# Patient Record
Sex: MALE | Race: WHITE | Employment: OTHER | URBAN - METROPOLITAN AREA
[De-identification: names, ages, dates, MRNs, and addresses within clinical notes are randomized per-mention and may not be internally consistent; named-entity substitution may affect disease eponyms.]

---

## 2018-02-14 LAB — PROSTATE SPECIFIC ANTIGEN: NORMAL

## 2018-06-11 ENCOUNTER — HOSPITAL ENCOUNTER (INPATIENT)
Age: 83
LOS: 3 days | Discharge: NURSING, REHAB OR LONG-TERM CARE | DRG: 300 | End: 2018-06-14
Attending: EMERGENCY MEDICINE | Admitting: INTERNAL MEDICINE
Payer: MEDICARE

## 2018-06-11 ENCOUNTER — APPOINTMENT (OUTPATIENT)
Dept: ULTRASOUND IMAGING | Age: 83
DRG: 300 | End: 2018-06-11
Payer: MEDICARE

## 2018-06-11 DIAGNOSIS — R60.0 BILATERAL LOWER EXTREMITY EDEMA: Primary | ICD-10-CM

## 2018-06-11 DIAGNOSIS — R26.2 UNABLE TO AMBULATE: ICD-10-CM

## 2018-06-11 DIAGNOSIS — I82.4Z1 LOWER LEG DVT (DEEP VENOUS THROMBOEMBOLISM), ACUTE, RIGHT (HCC): ICD-10-CM

## 2018-06-11 DIAGNOSIS — I82.4Z1 ACUTE DEEP VEIN THROMBOSIS (DVT) OF DISTAL END OF RIGHT LOWER EXTREMITY (HCC): ICD-10-CM

## 2018-06-11 PROBLEM — E11.9 DIABETES MELLITUS TYPE 2, CONTROLLED (HCC): Chronic | Status: ACTIVE | Noted: 2018-06-11

## 2018-06-11 PROBLEM — I82.401 ACUTE DEEP VEIN THROMBOSIS (DVT) OF RIGHT LOWER EXTREMITY (HCC): Status: ACTIVE | Noted: 2018-06-11

## 2018-06-11 PROBLEM — I89.0 LYMPHEDEMA OF LEG: Chronic | Status: ACTIVE | Noted: 2018-06-11

## 2018-06-11 PROBLEM — L03.119 CELLULITIS, LEG: Status: ACTIVE | Noted: 2018-06-11

## 2018-06-11 PROBLEM — E78.2 MIXED HYPERLIPIDEMIA: Chronic | Status: ACTIVE | Noted: 2018-06-11

## 2018-06-11 LAB
ALBUMIN SERPL-MCNC: 3.9 G/DL (ref 3.5–5.2)
ALP BLD-CCNC: 62 U/L (ref 40–129)
ALT SERPL-CCNC: <5 U/L (ref 0–40)
ANION GAP SERPL CALCULATED.3IONS-SCNC: 13 MMOL/L (ref 7–16)
ANISOCYTOSIS: ABNORMAL
APTT: 27 SEC (ref 24.5–35.1)
AST SERPL-CCNC: 10 U/L (ref 0–39)
BASOPHILS ABSOLUTE: 0.05 E9/L (ref 0–0.2)
BASOPHILS RELATIVE PERCENT: 0.4 % (ref 0–2)
BILIRUB SERPL-MCNC: 0.2 MG/DL (ref 0–1.2)
BUN BLDV-MCNC: 22 MG/DL (ref 8–23)
C-REACTIVE PROTEIN: 0.9 MG/DL (ref 0–0.4)
CALCIUM SERPL-MCNC: 9 MG/DL (ref 8.6–10.2)
CHLORIDE BLD-SCNC: 99 MMOL/L (ref 98–107)
CO2: 25 MMOL/L (ref 22–29)
CREAT SERPL-MCNC: 1.1 MG/DL (ref 0.7–1.2)
EOSINOPHILS ABSOLUTE: 0.25 E9/L (ref 0.05–0.5)
EOSINOPHILS RELATIVE PERCENT: 2.1 % (ref 0–6)
GFR AFRICAN AMERICAN: >60
GFR NON-AFRICAN AMERICAN: >60 ML/MIN/1.73
GLUCOSE BLD-MCNC: 204 MG/DL (ref 74–109)
HCT VFR BLD CALC: 36.4 % (ref 37–54)
HEMOGLOBIN: 10.4 G/DL (ref 12.5–16.5)
IMMATURE GRANULOCYTES #: 0.05 E9/L
IMMATURE GRANULOCYTES %: 0.4 % (ref 0–5)
INR BLD: 1
LACTIC ACID: 1.8 MMOL/L (ref 0.5–2.2)
LYMPHOCYTES ABSOLUTE: 1.62 E9/L (ref 1.5–4)
LYMPHOCYTES RELATIVE PERCENT: 13.5 % (ref 20–42)
MCH RBC QN AUTO: 24.2 PG (ref 26–35)
MCHC RBC AUTO-ENTMCNC: 28.6 % (ref 32–34.5)
MCV RBC AUTO: 84.8 FL (ref 80–99.9)
METER GLUCOSE: 135 MG/DL (ref 70–110)
METER GLUCOSE: 145 MG/DL (ref 70–110)
MONOCYTES ABSOLUTE: 0.96 E9/L (ref 0.1–0.95)
MONOCYTES RELATIVE PERCENT: 8 % (ref 2–12)
NEUTROPHILS ABSOLUTE: 9.11 E9/L (ref 1.8–7.3)
NEUTROPHILS RELATIVE PERCENT: 75.6 % (ref 43–80)
OVALOCYTES: ABNORMAL
PDW BLD-RTO: 16 FL (ref 11.5–15)
PLATELET # BLD: 320 E9/L (ref 130–450)
PMV BLD AUTO: 8.5 FL (ref 7–12)
POIKILOCYTES: ABNORMAL
POLYCHROMASIA: ABNORMAL
POTASSIUM SERPL-SCNC: 4.8 MMOL/L (ref 3.5–5)
PRO-BNP: 179 PG/ML (ref 0–450)
PROTHROMBIN TIME: 11.6 SEC (ref 9.3–12.4)
RBC # BLD: 4.29 E12/L (ref 3.8–5.8)
SEDIMENTATION RATE, ERYTHROCYTE: 29 MM/HR (ref 0–15)
SODIUM BLD-SCNC: 137 MMOL/L (ref 132–146)
TOTAL PROTEIN: 7.1 G/DL (ref 6.4–8.3)
WBC # BLD: 12 E9/L (ref 4.5–11.5)

## 2018-06-11 PROCEDURE — 85610 PROTHROMBIN TIME: CPT

## 2018-06-11 PROCEDURE — 80053 COMPREHEN METABOLIC PANEL: CPT

## 2018-06-11 PROCEDURE — 83605 ASSAY OF LACTIC ACID: CPT

## 2018-06-11 PROCEDURE — 85651 RBC SED RATE NONAUTOMATED: CPT

## 2018-06-11 PROCEDURE — 6370000000 HC RX 637 (ALT 250 FOR IP): Performed by: INTERNAL MEDICINE

## 2018-06-11 PROCEDURE — 87186 SC STD MICRODIL/AGAR DIL: CPT

## 2018-06-11 PROCEDURE — 83880 ASSAY OF NATRIURETIC PEPTIDE: CPT

## 2018-06-11 PROCEDURE — G0378 HOSPITAL OBSERVATION PER HR: HCPCS

## 2018-06-11 PROCEDURE — 82962 GLUCOSE BLOOD TEST: CPT

## 2018-06-11 PROCEDURE — 86140 C-REACTIVE PROTEIN: CPT

## 2018-06-11 PROCEDURE — 1200000000 HC SEMI PRIVATE

## 2018-06-11 PROCEDURE — 87040 BLOOD CULTURE FOR BACTERIA: CPT

## 2018-06-11 PROCEDURE — 96372 THER/PROPH/DIAG INJ SC/IM: CPT

## 2018-06-11 PROCEDURE — 85025 COMPLETE CBC W/AUTO DIFF WBC: CPT

## 2018-06-11 PROCEDURE — 99285 EMERGENCY DEPT VISIT HI MDM: CPT

## 2018-06-11 PROCEDURE — 93970 EXTREMITY STUDY: CPT

## 2018-06-11 PROCEDURE — 85730 THROMBOPLASTIN TIME PARTIAL: CPT

## 2018-06-11 PROCEDURE — 87070 CULTURE OTHR SPECIMN AEROBIC: CPT

## 2018-06-11 PROCEDURE — 96374 THER/PROPH/DIAG INJ IV PUSH: CPT

## 2018-06-11 PROCEDURE — 2580000003 HC RX 258: Performed by: INTERNAL MEDICINE

## 2018-06-11 PROCEDURE — 6360000002 HC RX W HCPCS: Performed by: EMERGENCY MEDICINE

## 2018-06-11 RX ORDER — GABAPENTIN 300 MG/1
300 CAPSULE ORAL 3 TIMES DAILY
COMMUNITY
End: 2021-01-01

## 2018-06-11 RX ORDER — PANTOPRAZOLE SODIUM 40 MG/1
40 GRANULE, DELAYED RELEASE ORAL
Status: DISCONTINUED | OUTPATIENT
Start: 2018-06-11 | End: 2018-06-14 | Stop reason: HOSPADM

## 2018-06-11 RX ORDER — DEXTROSE MONOHYDRATE 25 G/50ML
12.5 INJECTION, SOLUTION INTRAVENOUS PRN
Status: DISCONTINUED | OUTPATIENT
Start: 2018-06-11 | End: 2018-06-14 | Stop reason: HOSPADM

## 2018-06-11 RX ORDER — SODIUM CHLORIDE 0.9 % (FLUSH) 0.9 %
10 SYRINGE (ML) INJECTION EVERY 12 HOURS SCHEDULED
Status: DISCONTINUED | OUTPATIENT
Start: 2018-06-11 | End: 2018-06-14 | Stop reason: HOSPADM

## 2018-06-11 RX ORDER — ASPIRIN 81 MG/1
81 TABLET, CHEWABLE ORAL DAILY
Status: DISCONTINUED | OUTPATIENT
Start: 2018-06-11 | End: 2018-06-14 | Stop reason: HOSPADM

## 2018-06-11 RX ORDER — ANTIOX #8/OM3/DHA/EPA/LUT/ZEAX 250-2.5 MG
1 CAPSULE ORAL 2 TIMES DAILY
COMMUNITY

## 2018-06-11 RX ORDER — ACETAMINOPHEN 325 MG/1
650 TABLET ORAL EVERY 4 HOURS PRN
Status: DISCONTINUED | OUTPATIENT
Start: 2018-06-11 | End: 2018-06-14 | Stop reason: HOSPADM

## 2018-06-11 RX ORDER — FUROSEMIDE 40 MG/1
40 TABLET ORAL DAILY
Status: DISCONTINUED | OUTPATIENT
Start: 2018-06-11 | End: 2018-06-14 | Stop reason: HOSPADM

## 2018-06-11 RX ORDER — CHLORAL HYDRATE 500 MG
1000 CAPSULE ORAL 3 TIMES DAILY
COMMUNITY

## 2018-06-11 RX ORDER — CLOTRIMAZOLE 1 %
CREAM (GRAM) TOPICAL 2 TIMES DAILY
Status: DISCONTINUED | OUTPATIENT
Start: 2018-06-11 | End: 2018-06-14 | Stop reason: HOSPADM

## 2018-06-11 RX ORDER — PANTOPRAZOLE SODIUM 40 MG/1
40 GRANULE, DELAYED RELEASE ORAL
COMMUNITY

## 2018-06-11 RX ORDER — TAMSULOSIN HYDROCHLORIDE 0.4 MG/1
0.4 CAPSULE ORAL 2 TIMES DAILY
Status: DISCONTINUED | OUTPATIENT
Start: 2018-06-11 | End: 2018-06-14 | Stop reason: HOSPADM

## 2018-06-11 RX ORDER — MORPHINE SULFATE 2 MG/ML
2 INJECTION, SOLUTION INTRAMUSCULAR; INTRAVENOUS ONCE
Status: COMPLETED | OUTPATIENT
Start: 2018-06-11 | End: 2018-06-11

## 2018-06-11 RX ORDER — ASCORBIC ACID 500 MG
1000 TABLET ORAL DAILY
COMMUNITY

## 2018-06-11 RX ORDER — HYDROCODONE BITARTRATE AND ACETAMINOPHEN 5; 325 MG/1; MG/1
1 TABLET ORAL 3 TIMES DAILY PRN
Status: ON HOLD | COMMUNITY
End: 2018-06-14

## 2018-06-11 RX ORDER — HYDROCODONE BITARTRATE AND ACETAMINOPHEN 5; 325 MG/1; MG/1
1 TABLET ORAL 3 TIMES DAILY PRN
Status: DISCONTINUED | OUTPATIENT
Start: 2018-06-11 | End: 2018-06-14 | Stop reason: HOSPADM

## 2018-06-11 RX ORDER — GLIPIZIDE 5 MG/1
5 TABLET ORAL
Status: DISCONTINUED | OUTPATIENT
Start: 2018-06-11 | End: 2018-06-14 | Stop reason: HOSPADM

## 2018-06-11 RX ORDER — SIMVASTATIN 20 MG
20 TABLET ORAL NIGHTLY
Status: DISCONTINUED | OUTPATIENT
Start: 2018-06-11 | End: 2018-06-14 | Stop reason: HOSPADM

## 2018-06-11 RX ORDER — NICOTINE POLACRILEX 4 MG
15 LOZENGE BUCCAL PRN
Status: DISCONTINUED | OUTPATIENT
Start: 2018-06-11 | End: 2018-06-14 | Stop reason: HOSPADM

## 2018-06-11 RX ORDER — SIMVASTATIN 20 MG
20 TABLET ORAL NIGHTLY
COMMUNITY

## 2018-06-11 RX ORDER — SODIUM CHLORIDE 0.9 % (FLUSH) 0.9 %
10 SYRINGE (ML) INJECTION PRN
Status: DISCONTINUED | OUTPATIENT
Start: 2018-06-11 | End: 2018-06-14 | Stop reason: HOSPADM

## 2018-06-11 RX ORDER — GLIPIZIDE 5 MG/1
5 TABLET ORAL
COMMUNITY

## 2018-06-11 RX ORDER — TAMSULOSIN HYDROCHLORIDE 0.4 MG/1
0.4 CAPSULE ORAL 2 TIMES DAILY
COMMUNITY

## 2018-06-11 RX ORDER — ONDANSETRON 2 MG/ML
4 INJECTION INTRAMUSCULAR; INTRAVENOUS EVERY 6 HOURS PRN
Status: DISCONTINUED | OUTPATIENT
Start: 2018-06-11 | End: 2018-06-14 | Stop reason: HOSPADM

## 2018-06-11 RX ORDER — DEXTROSE MONOHYDRATE 50 MG/ML
100 INJECTION, SOLUTION INTRAVENOUS PRN
Status: DISCONTINUED | OUTPATIENT
Start: 2018-06-11 | End: 2018-06-14 | Stop reason: HOSPADM

## 2018-06-11 RX ORDER — ASPIRIN 81 MG/1
81 TABLET, CHEWABLE ORAL DAILY
COMMUNITY

## 2018-06-11 RX ORDER — GABAPENTIN 300 MG/1
300 CAPSULE ORAL EVERY 8 HOURS
Status: DISCONTINUED | OUTPATIENT
Start: 2018-06-11 | End: 2018-06-14 | Stop reason: HOSPADM

## 2018-06-11 RX ADMIN — PANTOPRAZOLE SODIUM 40 MG: 40 GRANULE, DELAYED RELEASE ORAL at 16:07

## 2018-06-11 RX ADMIN — ASPIRIN 81 MG 81 MG: 81 TABLET ORAL at 15:32

## 2018-06-11 RX ADMIN — SIMVASTATIN 20 MG: 20 TABLET, FILM COATED ORAL at 20:56

## 2018-06-11 RX ADMIN — PETROLATUM: 42 OINTMENT TOPICAL at 17:25

## 2018-06-11 RX ADMIN — Medication 10 ML: at 20:56

## 2018-06-11 RX ADMIN — GLIPIZIDE 5 MG: 5 TABLET ORAL at 16:07

## 2018-06-11 RX ADMIN — METFORMIN HYDROCHLORIDE 1000 MG: 1000 TABLET ORAL at 16:59

## 2018-06-11 RX ADMIN — GABAPENTIN 300 MG: 300 CAPSULE ORAL at 15:32

## 2018-06-11 RX ADMIN — FUROSEMIDE 40 MG: 40 TABLET ORAL at 15:32

## 2018-06-11 RX ADMIN — GABAPENTIN 300 MG: 300 CAPSULE ORAL at 22:54

## 2018-06-11 RX ADMIN — TAMSULOSIN HYDROCHLORIDE 0.4 MG: 0.4 CAPSULE ORAL at 20:56

## 2018-06-11 RX ADMIN — HYDROCODONE BITARTRATE AND ACETAMINOPHEN 1 TABLET: 5; 325 TABLET ORAL at 14:47

## 2018-06-11 RX ADMIN — ENOXAPARIN SODIUM 120 MG: 120 INJECTION SUBCUTANEOUS at 13:25

## 2018-06-11 RX ADMIN — INSULIN LISPRO 1 UNITS: 100 INJECTION, SOLUTION INTRAVENOUS; SUBCUTANEOUS at 20:56

## 2018-06-11 RX ADMIN — HYDROCODONE BITARTRATE AND ACETAMINOPHEN 1 TABLET: 5; 325 TABLET ORAL at 22:54

## 2018-06-11 RX ADMIN — MORPHINE SULFATE 2 MG: 2 INJECTION, SOLUTION INTRAMUSCULAR; INTRAVENOUS at 11:41

## 2018-06-11 RX ADMIN — CLOTRIMAZOLE: 10 CREAM TOPICAL at 20:56

## 2018-06-11 ASSESSMENT — PAIN DESCRIPTION - DESCRIPTORS
DESCRIPTORS: CONSTANT;DISCOMFORT
DESCRIPTORS: SHARP
DESCRIPTORS: ACHING;DISCOMFORT;SORE

## 2018-06-11 ASSESSMENT — PAIN DESCRIPTION - ORIENTATION
ORIENTATION: RIGHT;LEFT;LOWER
ORIENTATION: RIGHT;LEFT
ORIENTATION: RIGHT;LEFT
ORIENTATION: RIGHT;LEFT;LOWER
ORIENTATION: RIGHT;LEFT

## 2018-06-11 ASSESSMENT — PAIN DESCRIPTION - PROGRESSION: CLINICAL_PROGRESSION: NOT CHANGED

## 2018-06-11 ASSESSMENT — PAIN SCALES - GENERAL
PAINLEVEL_OUTOF10: 8
PAINLEVEL_OUTOF10: 4
PAINLEVEL_OUTOF10: 8
PAINLEVEL_OUTOF10: 6

## 2018-06-11 ASSESSMENT — PAIN DESCRIPTION - FREQUENCY
FREQUENCY: CONTINUOUS

## 2018-06-11 ASSESSMENT — PAIN DESCRIPTION - ONSET
ONSET: ON-GOING

## 2018-06-11 ASSESSMENT — ENCOUNTER SYMPTOMS
NAUSEA: 0
VOMITING: 0
EYE DISCHARGE: 0
SHORTNESS OF BREATH: 0
EYE PAIN: 0
ABDOMINAL PAIN: 0
DIARRHEA: 0
SORE THROAT: 0
EYE REDNESS: 0
BACK PAIN: 0
WHEEZING: 0
SINUS PRESSURE: 0
COUGH: 0

## 2018-06-11 ASSESSMENT — PAIN DESCRIPTION - PAIN TYPE
TYPE: ACUTE PAIN

## 2018-06-11 ASSESSMENT — PAIN DESCRIPTION - LOCATION
LOCATION: LEG

## 2018-06-12 LAB
ANION GAP SERPL CALCULATED.3IONS-SCNC: 14 MMOL/L (ref 7–16)
BUN BLDV-MCNC: 18 MG/DL (ref 8–23)
CALCIUM SERPL-MCNC: 8.8 MG/DL (ref 8.6–10.2)
CHLORIDE BLD-SCNC: 98 MMOL/L (ref 98–107)
CO2: 26 MMOL/L (ref 22–29)
CREAT SERPL-MCNC: 1.1 MG/DL (ref 0.7–1.2)
GFR AFRICAN AMERICAN: >60
GFR NON-AFRICAN AMERICAN: >60 ML/MIN/1.73
GLUCOSE BLD-MCNC: 170 MG/DL (ref 74–109)
HCT VFR BLD CALC: 35.7 % (ref 37–54)
HEMOGLOBIN: 10.4 G/DL (ref 12.5–16.5)
MCH RBC QN AUTO: 24.1 PG (ref 26–35)
MCHC RBC AUTO-ENTMCNC: 29.1 % (ref 32–34.5)
MCV RBC AUTO: 82.8 FL (ref 80–99.9)
METER GLUCOSE: 153 MG/DL (ref 70–110)
METER GLUCOSE: 171 MG/DL (ref 70–110)
METER GLUCOSE: 176 MG/DL (ref 70–110)
METER GLUCOSE: 182 MG/DL (ref 70–110)
PDW BLD-RTO: 16.2 FL (ref 11.5–15)
PLATELET # BLD: 308 E9/L (ref 130–450)
PMV BLD AUTO: 8.8 FL (ref 7–12)
POTASSIUM SERPL-SCNC: 4.1 MMOL/L (ref 3.5–5)
RBC # BLD: 4.31 E12/L (ref 3.8–5.8)
SODIUM BLD-SCNC: 138 MMOL/L (ref 132–146)
WBC # BLD: 11 E9/L (ref 4.5–11.5)

## 2018-06-12 PROCEDURE — G8987 SELF CARE CURRENT STATUS: HCPCS

## 2018-06-12 PROCEDURE — 36415 COLL VENOUS BLD VENIPUNCTURE: CPT

## 2018-06-12 PROCEDURE — 6370000000 HC RX 637 (ALT 250 FOR IP): Performed by: INTERNAL MEDICINE

## 2018-06-12 PROCEDURE — 6360000002 HC RX W HCPCS: Performed by: INTERNAL MEDICINE

## 2018-06-12 PROCEDURE — G0378 HOSPITAL OBSERVATION PER HR: HCPCS

## 2018-06-12 PROCEDURE — 82962 GLUCOSE BLOOD TEST: CPT

## 2018-06-12 PROCEDURE — 97161 PT EVAL LOW COMPLEX 20 MIN: CPT

## 2018-06-12 PROCEDURE — 97535 SELF CARE MNGMENT TRAINING: CPT

## 2018-06-12 PROCEDURE — 80048 BASIC METABOLIC PNL TOTAL CA: CPT

## 2018-06-12 PROCEDURE — 1200000000 HC SEMI PRIVATE

## 2018-06-12 PROCEDURE — G8979 MOBILITY GOAL STATUS: HCPCS

## 2018-06-12 PROCEDURE — 97165 OT EVAL LOW COMPLEX 30 MIN: CPT

## 2018-06-12 PROCEDURE — G8988 SELF CARE GOAL STATUS: HCPCS

## 2018-06-12 PROCEDURE — 2580000003 HC RX 258: Performed by: INTERNAL MEDICINE

## 2018-06-12 PROCEDURE — 97530 THERAPEUTIC ACTIVITIES: CPT

## 2018-06-12 PROCEDURE — 87081 CULTURE SCREEN ONLY: CPT

## 2018-06-12 PROCEDURE — 96372 THER/PROPH/DIAG INJ SC/IM: CPT

## 2018-06-12 PROCEDURE — 6370000000 HC RX 637 (ALT 250 FOR IP): Performed by: SPECIALIST

## 2018-06-12 PROCEDURE — 85027 COMPLETE CBC AUTOMATED: CPT

## 2018-06-12 PROCEDURE — G8978 MOBILITY CURRENT STATUS: HCPCS

## 2018-06-12 RX ORDER — DOXYCYCLINE HYCLATE 100 MG/1
100 CAPSULE ORAL EVERY 12 HOURS SCHEDULED
Status: DISCONTINUED | OUTPATIENT
Start: 2018-06-12 | End: 2018-06-14 | Stop reason: HOSPADM

## 2018-06-12 RX ADMIN — METFORMIN HYDROCHLORIDE 1000 MG: 1000 TABLET ORAL at 17:01

## 2018-06-12 RX ADMIN — PANTOPRAZOLE SODIUM 40 MG: 40 GRANULE, DELAYED RELEASE ORAL at 06:57

## 2018-06-12 RX ADMIN — GABAPENTIN 300 MG: 300 CAPSULE ORAL at 06:56

## 2018-06-12 RX ADMIN — PETROLATUM: 42 OINTMENT TOPICAL at 09:17

## 2018-06-12 RX ADMIN — METFORMIN HYDROCHLORIDE 1000 MG: 1000 TABLET ORAL at 09:17

## 2018-06-12 RX ADMIN — GLIPIZIDE 5 MG: 5 TABLET ORAL at 06:56

## 2018-06-12 RX ADMIN — TAMSULOSIN HYDROCHLORIDE 0.4 MG: 0.4 CAPSULE ORAL at 20:31

## 2018-06-12 RX ADMIN — CLOTRIMAZOLE: 10 CREAM TOPICAL at 09:17

## 2018-06-12 RX ADMIN — Medication 10 ML: at 09:18

## 2018-06-12 RX ADMIN — SIMVASTATIN 20 MG: 20 TABLET, FILM COATED ORAL at 20:31

## 2018-06-12 RX ADMIN — INSULIN LISPRO 1 UNITS: 100 INJECTION, SOLUTION INTRAVENOUS; SUBCUTANEOUS at 20:33

## 2018-06-12 RX ADMIN — FUROSEMIDE 40 MG: 40 TABLET ORAL at 06:56

## 2018-06-12 RX ADMIN — TAMSULOSIN HYDROCHLORIDE 0.4 MG: 0.4 CAPSULE ORAL at 09:17

## 2018-06-12 RX ADMIN — INSULIN LISPRO 2 UNITS: 100 INJECTION, SOLUTION INTRAVENOUS; SUBCUTANEOUS at 08:19

## 2018-06-12 RX ADMIN — CLOTRIMAZOLE: 10 CREAM TOPICAL at 20:31

## 2018-06-12 RX ADMIN — GABAPENTIN 300 MG: 300 CAPSULE ORAL at 15:34

## 2018-06-12 RX ADMIN — DOXYCYCLINE HYCLATE 100 MG: 100 CAPSULE, GELATIN COATED ORAL at 20:31

## 2018-06-12 RX ADMIN — PETROLATUM: 42 OINTMENT TOPICAL at 00:50

## 2018-06-12 RX ADMIN — ENOXAPARIN SODIUM 120 MG: 120 INJECTION SUBCUTANEOUS at 13:39

## 2018-06-12 RX ADMIN — ASPIRIN 81 MG 81 MG: 81 TABLET ORAL at 09:17

## 2018-06-12 RX ADMIN — GLIPIZIDE 5 MG: 5 TABLET ORAL at 17:01

## 2018-06-12 RX ADMIN — INSULIN LISPRO 2 UNITS: 100 INJECTION, SOLUTION INTRAVENOUS; SUBCUTANEOUS at 12:11

## 2018-06-12 RX ADMIN — INSULIN LISPRO 2 UNITS: 100 INJECTION, SOLUTION INTRAVENOUS; SUBCUTANEOUS at 17:01

## 2018-06-12 RX ADMIN — PANTOPRAZOLE SODIUM 40 MG: 40 GRANULE, DELAYED RELEASE ORAL at 17:01

## 2018-06-12 RX ADMIN — GABAPENTIN 300 MG: 300 CAPSULE ORAL at 22:51

## 2018-06-12 RX ADMIN — ENOXAPARIN SODIUM 120 MG: 120 INJECTION SUBCUTANEOUS at 00:52

## 2018-06-12 RX ADMIN — Medication 10 ML: at 20:31

## 2018-06-12 ASSESSMENT — PAIN SCALES - GENERAL: PAINLEVEL_OUTOF10: 0

## 2018-06-13 LAB
ANTISTREPTOLYSIN-O: 47 IU/ML (ref 0–200)
C-REACTIVE PROTEIN: 2.5 MG/DL (ref 0–0.4)
METER GLUCOSE: 117 MG/DL (ref 70–110)
METER GLUCOSE: 139 MG/DL (ref 70–110)
METER GLUCOSE: 148 MG/DL (ref 70–110)
METER GLUCOSE: 194 MG/DL (ref 70–110)
SEDIMENTATION RATE, ERYTHROCYTE: 32 MM/HR (ref 0–15)

## 2018-06-13 PROCEDURE — 86140 C-REACTIVE PROTEIN: CPT

## 2018-06-13 PROCEDURE — 86060 ANTISTREPTOLYSIN O TITER: CPT

## 2018-06-13 PROCEDURE — 97535 SELF CARE MNGMENT TRAINING: CPT

## 2018-06-13 PROCEDURE — 6360000002 HC RX W HCPCS: Performed by: INTERNAL MEDICINE

## 2018-06-13 PROCEDURE — 97530 THERAPEUTIC ACTIVITIES: CPT

## 2018-06-13 PROCEDURE — 1200000000 HC SEMI PRIVATE

## 2018-06-13 PROCEDURE — 82962 GLUCOSE BLOOD TEST: CPT

## 2018-06-13 PROCEDURE — 96372 THER/PROPH/DIAG INJ SC/IM: CPT

## 2018-06-13 PROCEDURE — 36415 COLL VENOUS BLD VENIPUNCTURE: CPT

## 2018-06-13 PROCEDURE — G0378 HOSPITAL OBSERVATION PER HR: HCPCS

## 2018-06-13 PROCEDURE — 6370000000 HC RX 637 (ALT 250 FOR IP): Performed by: INTERNAL MEDICINE

## 2018-06-13 PROCEDURE — 97110 THERAPEUTIC EXERCISES: CPT

## 2018-06-13 PROCEDURE — 85651 RBC SED RATE NONAUTOMATED: CPT

## 2018-06-13 PROCEDURE — 2580000003 HC RX 258: Performed by: INTERNAL MEDICINE

## 2018-06-13 PROCEDURE — 6370000000 HC RX 637 (ALT 250 FOR IP): Performed by: SPECIALIST

## 2018-06-13 RX ADMIN — CLOTRIMAZOLE: 10 CREAM TOPICAL at 13:48

## 2018-06-13 RX ADMIN — GABAPENTIN 300 MG: 300 CAPSULE ORAL at 16:21

## 2018-06-13 RX ADMIN — ASPIRIN 81 MG 81 MG: 81 TABLET ORAL at 08:12

## 2018-06-13 RX ADMIN — Medication 10 ML: at 21:04

## 2018-06-13 RX ADMIN — METFORMIN HYDROCHLORIDE 1000 MG: 1000 TABLET ORAL at 16:21

## 2018-06-13 RX ADMIN — FUROSEMIDE 40 MG: 40 TABLET ORAL at 08:12

## 2018-06-13 RX ADMIN — DOXYCYCLINE HYCLATE 100 MG: 100 CAPSULE, GELATIN COATED ORAL at 08:12

## 2018-06-13 RX ADMIN — SIMVASTATIN 20 MG: 20 TABLET, FILM COATED ORAL at 20:41

## 2018-06-13 RX ADMIN — TAMSULOSIN HYDROCHLORIDE 0.4 MG: 0.4 CAPSULE ORAL at 08:12

## 2018-06-13 RX ADMIN — PANTOPRAZOLE SODIUM 40 MG: 40 GRANULE, DELAYED RELEASE ORAL at 08:12

## 2018-06-13 RX ADMIN — APIXABAN 5 MG: 5 TABLET, FILM COATED ORAL at 17:47

## 2018-06-13 RX ADMIN — APIXABAN 5 MG: 5 TABLET, FILM COATED ORAL at 20:40

## 2018-06-13 RX ADMIN — PETROLATUM: 42 OINTMENT TOPICAL at 13:47

## 2018-06-13 RX ADMIN — Medication 10 ML: at 08:13

## 2018-06-13 RX ADMIN — GABAPENTIN 300 MG: 300 CAPSULE ORAL at 23:46

## 2018-06-13 RX ADMIN — ENOXAPARIN SODIUM 120 MG: 120 INJECTION SUBCUTANEOUS at 13:47

## 2018-06-13 RX ADMIN — PANTOPRAZOLE SODIUM 40 MG: 40 GRANULE, DELAYED RELEASE ORAL at 16:22

## 2018-06-13 RX ADMIN — CLOTRIMAZOLE: 10 CREAM TOPICAL at 20:42

## 2018-06-13 RX ADMIN — GLIPIZIDE 5 MG: 5 TABLET ORAL at 08:12

## 2018-06-13 RX ADMIN — GABAPENTIN 300 MG: 300 CAPSULE ORAL at 08:12

## 2018-06-13 RX ADMIN — GLIPIZIDE 5 MG: 5 TABLET ORAL at 16:21

## 2018-06-13 RX ADMIN — METFORMIN HYDROCHLORIDE 1000 MG: 1000 TABLET ORAL at 08:12

## 2018-06-13 RX ADMIN — INSULIN LISPRO 2 UNITS: 100 INJECTION, SOLUTION INTRAVENOUS; SUBCUTANEOUS at 16:21

## 2018-06-13 RX ADMIN — DOXYCYCLINE HYCLATE 100 MG: 100 CAPSULE, GELATIN COATED ORAL at 20:41

## 2018-06-13 RX ADMIN — INSULIN LISPRO 2 UNITS: 100 INJECTION, SOLUTION INTRAVENOUS; SUBCUTANEOUS at 08:23

## 2018-06-13 RX ADMIN — TAMSULOSIN HYDROCHLORIDE 0.4 MG: 0.4 CAPSULE ORAL at 20:41

## 2018-06-13 RX ADMIN — ENOXAPARIN SODIUM 120 MG: 120 INJECTION SUBCUTANEOUS at 01:13

## 2018-06-13 ASSESSMENT — PAIN SCALES - GENERAL: PAINLEVEL_OUTOF10: 0

## 2018-06-14 VITALS
RESPIRATION RATE: 18 BRPM | HEIGHT: 71 IN | OXYGEN SATURATION: 93 % | DIASTOLIC BLOOD PRESSURE: 63 MMHG | SYSTOLIC BLOOD PRESSURE: 114 MMHG | WEIGHT: 280.56 LBS | TEMPERATURE: 97.6 F | HEART RATE: 74 BPM | BODY MASS INDEX: 39.28 KG/M2

## 2018-06-14 LAB
ANION GAP SERPL CALCULATED.3IONS-SCNC: 16 MMOL/L (ref 7–16)
BUN BLDV-MCNC: 32 MG/DL (ref 8–23)
CALCIUM SERPL-MCNC: 8.5 MG/DL (ref 8.6–10.2)
CHLORIDE BLD-SCNC: 98 MMOL/L (ref 98–107)
CO2: 23 MMOL/L (ref 22–29)
CREAT SERPL-MCNC: 1.6 MG/DL (ref 0.7–1.2)
FERRITIN: 15 NG/ML
GFR AFRICAN AMERICAN: 50
GFR NON-AFRICAN AMERICAN: 41 ML/MIN/1.73
GLUCOSE BLD-MCNC: 165 MG/DL (ref 74–109)
HCT VFR BLD CALC: 35.8 % (ref 37–54)
HEMOGLOBIN: 10.4 G/DL (ref 12.5–16.5)
IRON SATURATION: 13 % (ref 20–55)
IRON: 44 MCG/DL (ref 59–158)
MCH RBC QN AUTO: 24.5 PG (ref 26–35)
MCHC RBC AUTO-ENTMCNC: 29.1 % (ref 32–34.5)
MCV RBC AUTO: 84.2 FL (ref 80–99.9)
METER GLUCOSE: 157 MG/DL (ref 70–110)
METER GLUCOSE: 158 MG/DL (ref 70–110)
MRSA CULTURE ONLY: NORMAL
ORGANISM: ABNORMAL
PDW BLD-RTO: 16.4 FL (ref 11.5–15)
PLATELET # BLD: 276 E9/L (ref 130–450)
PMV BLD AUTO: 8.6 FL (ref 7–12)
POTASSIUM SERPL-SCNC: 4.1 MMOL/L (ref 3.5–5)
RBC # BLD: 4.25 E12/L (ref 3.8–5.8)
SODIUM BLD-SCNC: 137 MMOL/L (ref 132–146)
TOTAL IRON BINDING CAPACITY: 335 MCG/DL (ref 250–450)
WBC # BLD: 11.3 E9/L (ref 4.5–11.5)
WOUND/ABSCESS: ABNORMAL
WOUND/ABSCESS: ABNORMAL

## 2018-06-14 PROCEDURE — G0378 HOSPITAL OBSERVATION PER HR: HCPCS

## 2018-06-14 PROCEDURE — 80048 BASIC METABOLIC PNL TOTAL CA: CPT

## 2018-06-14 PROCEDURE — 2580000003 HC RX 258: Performed by: INTERNAL MEDICINE

## 2018-06-14 PROCEDURE — 36415 COLL VENOUS BLD VENIPUNCTURE: CPT

## 2018-06-14 PROCEDURE — 85027 COMPLETE CBC AUTOMATED: CPT

## 2018-06-14 PROCEDURE — 83550 IRON BINDING TEST: CPT

## 2018-06-14 PROCEDURE — 6370000000 HC RX 637 (ALT 250 FOR IP): Performed by: INTERNAL MEDICINE

## 2018-06-14 PROCEDURE — 83540 ASSAY OF IRON: CPT

## 2018-06-14 PROCEDURE — 6370000000 HC RX 637 (ALT 250 FOR IP): Performed by: SPECIALIST

## 2018-06-14 PROCEDURE — 82728 ASSAY OF FERRITIN: CPT

## 2018-06-14 PROCEDURE — 82962 GLUCOSE BLOOD TEST: CPT

## 2018-06-14 PROCEDURE — 97530 THERAPEUTIC ACTIVITIES: CPT

## 2018-06-14 RX ORDER — CLOTRIMAZOLE 1 %
CREAM (GRAM) TOPICAL
Refills: 1 | DISCHARGE
Start: 2018-06-14 | End: 2018-06-21

## 2018-06-14 RX ORDER — DOXYCYCLINE HYCLATE 100 MG/1
100 CAPSULE ORAL EVERY 12 HOURS SCHEDULED
Qty: 20 CAPSULE | Refills: 0 | DISCHARGE
Start: 2018-06-14 | End: 2018-06-24

## 2018-06-14 RX ORDER — HYDROCODONE BITARTRATE AND ACETAMINOPHEN 5; 325 MG/1; MG/1
1 TABLET ORAL 3 TIMES DAILY PRN
Qty: 15 TABLET | Refills: 0 | Status: SHIPPED | OUTPATIENT
Start: 2018-06-14 | End: 2018-06-19

## 2018-06-14 RX ADMIN — Medication 10 ML: at 08:50

## 2018-06-14 RX ADMIN — GLIPIZIDE 5 MG: 5 TABLET ORAL at 06:21

## 2018-06-14 RX ADMIN — METFORMIN HYDROCHLORIDE 1000 MG: 1000 TABLET ORAL at 16:45

## 2018-06-14 RX ADMIN — INSULIN LISPRO 2 UNITS: 100 INJECTION, SOLUTION INTRAVENOUS; SUBCUTANEOUS at 08:51

## 2018-06-14 RX ADMIN — FUROSEMIDE 40 MG: 40 TABLET ORAL at 06:22

## 2018-06-14 RX ADMIN — METFORMIN HYDROCHLORIDE 1000 MG: 1000 TABLET ORAL at 08:50

## 2018-06-14 RX ADMIN — PANTOPRAZOLE SODIUM 40 MG: 40 GRANULE, DELAYED RELEASE ORAL at 06:21

## 2018-06-14 RX ADMIN — GLIPIZIDE 5 MG: 5 TABLET ORAL at 16:45

## 2018-06-14 RX ADMIN — DOXYCYCLINE HYCLATE 100 MG: 100 CAPSULE, GELATIN COATED ORAL at 08:50

## 2018-06-14 RX ADMIN — GABAPENTIN 300 MG: 300 CAPSULE ORAL at 15:21

## 2018-06-14 RX ADMIN — TAMSULOSIN HYDROCHLORIDE 0.4 MG: 0.4 CAPSULE ORAL at 08:50

## 2018-06-14 RX ADMIN — ASPIRIN 81 MG 81 MG: 81 TABLET ORAL at 08:50

## 2018-06-14 RX ADMIN — APIXABAN 5 MG: 5 TABLET, FILM COATED ORAL at 08:50

## 2018-06-14 RX ADMIN — PANTOPRAZOLE SODIUM 40 MG: 40 GRANULE, DELAYED RELEASE ORAL at 16:45

## 2018-06-14 RX ADMIN — PETROLATUM: 42 OINTMENT TOPICAL at 08:50

## 2018-06-14 RX ADMIN — CLOTRIMAZOLE: 10 CREAM TOPICAL at 08:50

## 2018-06-14 RX ADMIN — GABAPENTIN 300 MG: 300 CAPSULE ORAL at 06:21

## 2018-06-14 RX ADMIN — INSULIN LISPRO 2 UNITS: 100 INJECTION, SOLUTION INTRAVENOUS; SUBCUTANEOUS at 12:42

## 2018-06-14 ASSESSMENT — PAIN SCALES - GENERAL: PAINLEVEL_OUTOF10: 0

## 2018-06-15 ENCOUNTER — HOSPITAL ENCOUNTER (OUTPATIENT)
Age: 83
Discharge: HOME | End: 2018-06-17
Payer: COMMERCIAL

## 2018-06-15 LAB
ALBUMIN SERPL-MCNC: 3.7 G/DL (ref 3.5–5.2)
ALP BLD-CCNC: 55 U/L (ref 40–129)
ALT SERPL-CCNC: 7 U/L (ref 0–40)
ANION GAP SERPL CALCULATED.3IONS-SCNC: 16 MMOL/L (ref 7–16)
ANISOCYTOSIS: ABNORMAL
AST SERPL-CCNC: 12 U/L (ref 0–39)
BASOPHILS ABSOLUTE: 0.06 E9/L (ref 0–0.2)
BASOPHILS RELATIVE PERCENT: 0.5 % (ref 0–2)
BILIRUB SERPL-MCNC: 0.3 MG/DL (ref 0–1.2)
BUN BLDV-MCNC: 34 MG/DL (ref 8–23)
CALCIUM SERPL-MCNC: 8.7 MG/DL (ref 8.6–10.2)
CHLORIDE BLD-SCNC: 98 MMOL/L (ref 98–107)
CHOLESTEROL, TOTAL: 105 MG/DL (ref 0–199)
CO2: 24 MMOL/L (ref 22–29)
CREAT SERPL-MCNC: 1.5 MG/DL (ref 0.7–1.2)
EOSINOPHILS ABSOLUTE: 0.24 E9/L (ref 0.05–0.5)
EOSINOPHILS RELATIVE PERCENT: 2.2 % (ref 0–6)
GFR AFRICAN AMERICAN: 54
GFR NON-AFRICAN AMERICAN: 45 ML/MIN/1.73
GLUCOSE BLD-MCNC: 151 MG/DL (ref 74–109)
HBA1C MFR BLD: 8.7 % (ref 4.8–5.9)
HCT VFR BLD CALC: 35.3 % (ref 37–54)
HDLC SERPL-MCNC: 32 MG/DL
HEMOGLOBIN: 10.1 G/DL (ref 12.5–16.5)
IMMATURE GRANULOCYTES #: 0.08 E9/L
IMMATURE GRANULOCYTES %: 0.7 % (ref 0–5)
LDL CHOLESTEROL CALCULATED: 31 MG/DL (ref 0–99)
LYMPHOCYTES ABSOLUTE: 1.66 E9/L (ref 1.5–4)
LYMPHOCYTES RELATIVE PERCENT: 14.9 % (ref 20–42)
MCH RBC QN AUTO: 23.8 PG (ref 26–35)
MCHC RBC AUTO-ENTMCNC: 28.6 % (ref 32–34.5)
MCV RBC AUTO: 83.3 FL (ref 80–99.9)
MONOCYTES ABSOLUTE: 1.06 E9/L (ref 0.1–0.95)
MONOCYTES RELATIVE PERCENT: 9.5 % (ref 2–12)
NEUTROPHILS ABSOLUTE: 8.05 E9/L (ref 1.8–7.3)
NEUTROPHILS RELATIVE PERCENT: 72.2 % (ref 43–80)
OVALOCYTES: ABNORMAL
PDW BLD-RTO: 16.5 FL (ref 11.5–15)
PLATELET # BLD: 295 E9/L (ref 130–450)
PMV BLD AUTO: 9.4 FL (ref 7–12)
POIKILOCYTES: ABNORMAL
POLYCHROMASIA: ABNORMAL
POTASSIUM SERPL-SCNC: 4 MMOL/L (ref 3.5–5)
RBC # BLD: 4.24 E12/L (ref 3.8–5.8)
SODIUM BLD-SCNC: 138 MMOL/L (ref 132–146)
T4 FREE: 1.12 NG/DL (ref 0.93–1.7)
TOTAL PROTEIN: 6.6 G/DL (ref 6.4–8.3)
TRIGL SERPL-MCNC: 211 MG/DL (ref 0–149)
TSH SERPL DL<=0.05 MIU/L-ACNC: 1.57 UIU/ML (ref 0.27–4.2)
VITAMIN B-12: 791 PG/ML (ref 211–946)
VLDLC SERPL CALC-MCNC: 42 MG/DL
WBC # BLD: 11.2 E9/L (ref 4.5–11.5)

## 2018-06-15 PROCEDURE — 84443 ASSAY THYROID STIM HORMONE: CPT

## 2018-06-15 PROCEDURE — 80061 LIPID PANEL: CPT

## 2018-06-15 PROCEDURE — 83036 HEMOGLOBIN GLYCOSYLATED A1C: CPT

## 2018-06-15 PROCEDURE — 80053 COMPREHEN METABOLIC PANEL: CPT

## 2018-06-15 PROCEDURE — 82607 VITAMIN B-12: CPT

## 2018-06-15 PROCEDURE — 85025 COMPLETE CBC W/AUTO DIFF WBC: CPT

## 2018-06-15 PROCEDURE — 84439 ASSAY OF FREE THYROXINE: CPT

## 2018-06-16 LAB
BLOOD CULTURE, ROUTINE: NORMAL
CULTURE, BLOOD 2: NORMAL

## 2019-10-22 ENCOUNTER — HOSPITAL ENCOUNTER (EMERGENCY)
Age: 84
Discharge: HOME | End: 2019-10-22
Attending: EMERGENCY MEDICINE
Payer: MEDICARE

## 2019-10-22 VITALS
HEART RATE: 64 BPM | RESPIRATION RATE: 16 BRPM | DIASTOLIC BLOOD PRESSURE: 71 MMHG | OXYGEN SATURATION: 93 % | TEMPERATURE: 98 F | SYSTOLIC BLOOD PRESSURE: 132 MMHG

## 2019-10-22 DIAGNOSIS — H10.45 OTHER CHRONIC ALLERGIC CONJUNCTIVITIS OF BOTH EYES: Primary | ICD-10-CM

## 2019-10-22 LAB — METER GLUCOSE: 181 MG/DL (ref 74–99)

## 2019-10-22 PROCEDURE — 99284 EMERGENCY DEPT VISIT MOD MDM: CPT

## 2019-10-22 PROCEDURE — 82962 GLUCOSE BLOOD TEST: CPT

## 2019-10-22 RX ORDER — TOBRAMYCIN AND DEXAMETHASONE 3; 1 MG/ML; MG/ML
1 SUSPENSION/ DROPS OPHTHALMIC
Qty: 1 BOTTLE | Refills: 0 | Status: SHIPPED | OUTPATIENT
Start: 2019-10-22 | End: 2019-11-01

## 2019-10-22 ASSESSMENT — ENCOUNTER SYMPTOMS
EYE PAIN: 0
DIARRHEA: 0
EYE ITCHING: 1
ABDOMINAL PAIN: 0
EYE DISCHARGE: 1
EYE REDNESS: 1
PHOTOPHOBIA: 1
VOMITING: 0
WHEEZING: 0
SHORTNESS OF BREATH: 0
NAUSEA: 0

## 2019-10-22 ASSESSMENT — PAIN DESCRIPTION - LOCATION: LOCATION: EYE

## 2019-10-22 ASSESSMENT — PAIN DESCRIPTION - PAIN TYPE: TYPE: ACUTE PAIN

## 2019-10-22 ASSESSMENT — PAIN DESCRIPTION - ORIENTATION: ORIENTATION: RIGHT;LEFT

## 2019-12-09 LAB
AVERAGE GLUCOSE: NORMAL
FOLATE: NORMAL
HBA1C MFR BLD: 8.3 %
VITAMIN B-12: NORMAL

## 2020-01-01 ENCOUNTER — OUTSIDE SERVICES (OUTPATIENT)
Dept: FAMILY MEDICINE CLINIC | Age: 85
End: 2020-01-01
Payer: MEDICARE

## 2020-01-01 ENCOUNTER — NURSE ONLY (OUTPATIENT)
Dept: FAMILY MEDICINE CLINIC | Age: 85
End: 2020-01-01
Payer: MEDICARE

## 2020-01-01 VITALS
RESPIRATION RATE: 18 BRPM | DIASTOLIC BLOOD PRESSURE: 67 MMHG | BODY MASS INDEX: 40.31 KG/M2 | OXYGEN SATURATION: 93 % | TEMPERATURE: 97.5 F | SYSTOLIC BLOOD PRESSURE: 125 MMHG | WEIGHT: 289 LBS | HEART RATE: 59 BPM

## 2020-01-01 VITALS
RESPIRATION RATE: 18 BRPM | DIASTOLIC BLOOD PRESSURE: 65 MMHG | TEMPERATURE: 97.9 F | OXYGEN SATURATION: 99 % | WEIGHT: 286 LBS | HEART RATE: 83 BPM | SYSTOLIC BLOOD PRESSURE: 122 MMHG | BODY MASS INDEX: 39.89 KG/M2

## 2020-01-01 VITALS
HEART RATE: 59 BPM | SYSTOLIC BLOOD PRESSURE: 125 MMHG | RESPIRATION RATE: 18 BRPM | DIASTOLIC BLOOD PRESSURE: 67 MMHG | TEMPERATURE: 97.3 F | WEIGHT: 289 LBS | OXYGEN SATURATION: 93 % | BODY MASS INDEX: 40.31 KG/M2

## 2020-01-01 VITALS
RESPIRATION RATE: 18 BRPM | DIASTOLIC BLOOD PRESSURE: 58 MMHG | HEIGHT: 71 IN | OXYGEN SATURATION: 91 % | TEMPERATURE: 97.9 F | BODY MASS INDEX: 39.62 KG/M2 | SYSTOLIC BLOOD PRESSURE: 113 MMHG | WEIGHT: 283 LBS | HEART RATE: 68 BPM

## 2020-01-01 LAB
ALBUMIN SERPL-MCNC: NORMAL G/DL
ALP BLD-CCNC: NORMAL U/L
ALT SERPL-CCNC: NORMAL U/L
ANION GAP SERPL CALCULATED.3IONS-SCNC: NORMAL MMOL/L
AST SERPL-CCNC: NORMAL U/L
BILIRUB SERPL-MCNC: NORMAL MG/DL
BUN BLDV-MCNC: NORMAL MG/DL
CALCIUM SERPL-MCNC: NORMAL MG/DL
CHLORIDE BLD-SCNC: NORMAL MMOL/L
CHOLESTEROL, TOTAL: NORMAL
CHOLESTEROL/HDL RATIO: NORMAL
CO2: NORMAL
CREAT SERPL-MCNC: NORMAL MG/DL
GFR CALCULATED: NORMAL
GLUCOSE BLD-MCNC: NORMAL MG/DL
HDLC SERPL-MCNC: NORMAL MG/DL
LDL CHOLESTEROL CALCULATED: NORMAL
NONHDLC SERPL-MCNC: NORMAL MG/DL
POTASSIUM SERPL-SCNC: NORMAL MMOL/L
SODIUM BLD-SCNC: NORMAL MMOL/L
TOTAL PROTEIN: NORMAL
TRIGL SERPL-MCNC: NORMAL MG/DL
VLDLC SERPL CALC-MCNC: NORMAL MG/DL

## 2020-01-01 PROCEDURE — 90694 VACC AIIV4 NO PRSRV 0.5ML IM: CPT | Performed by: CLINICAL NURSE SPECIALIST

## 2020-01-01 PROCEDURE — G0008 ADMIN INFLUENZA VIRUS VAC: HCPCS | Performed by: CLINICAL NURSE SPECIALIST

## 2020-01-01 RX ORDER — CLOTRIMAZOLE 1 %
CREAM (GRAM) TOPICAL 2 TIMES DAILY
COMMUNITY

## 2020-01-01 RX ORDER — HYDROCODONE BITARTRATE AND ACETAMINOPHEN 10; 325 MG/1; MG/1
1 TABLET ORAL 4 TIMES DAILY
COMMUNITY

## 2020-01-01 RX ORDER — FERROUS SULFATE 325(65) MG
325 TABLET ORAL 2 TIMES DAILY
COMMUNITY

## 2020-01-01 RX ORDER — NYSTATIN 100000 U/G
CREAM TOPICAL DAILY
COMMUNITY

## 2020-01-01 RX ORDER — LORAZEPAM 0.5 MG/1
0.5 TABLET ORAL DAILY
COMMUNITY

## 2020-01-01 ASSESSMENT — ENCOUNTER SYMPTOMS
DIARRHEA: 1
RESPIRATORY NEGATIVE: 1
EYES NEGATIVE: 1
BACK PAIN: 1
RESPIRATORY NEGATIVE: 1
DIARRHEA: 1
RESPIRATORY NEGATIVE: 1
DIARRHEA: 1
ALLERGIC/IMMUNOLOGIC NEGATIVE: 1
EYES NEGATIVE: 1
ALLERGIC/IMMUNOLOGIC NEGATIVE: 1
RESPIRATORY NEGATIVE: 1
ALLERGIC/IMMUNOLOGIC NEGATIVE: 1
EYES NEGATIVE: 1
DIARRHEA: 1
EYES NEGATIVE: 1
ALLERGIC/IMMUNOLOGIC NEGATIVE: 1

## 2020-08-25 PROBLEM — F32.A DEPRESSION: Status: ACTIVE | Noted: 2020-01-01

## 2020-08-25 PROBLEM — G45.9 TIA (TRANSIENT ISCHEMIC ATTACK): Status: ACTIVE | Noted: 2020-01-01

## 2020-08-25 PROBLEM — K21.9 GASTROESOPHAGEAL REFLUX DISEASE WITHOUT ESOPHAGITIS: Status: ACTIVE | Noted: 2020-01-01

## 2020-08-25 PROBLEM — F41.9 ANXIETY: Status: ACTIVE | Noted: 2020-01-01

## 2020-08-25 PROBLEM — R19.7 DIARRHEA: Status: ACTIVE | Noted: 2020-01-01

## 2020-08-25 PROBLEM — I63.9 CEREBROVASCULAR ACCIDENT (CVA) (HCC): Status: ACTIVE | Noted: 2020-01-01

## 2020-08-25 NOTE — PROGRESS NOTES
Subjective:      Patient ID: Jenni Belcher is a 80 y.o. male. Patient seen today as follow up for medication and lab review. Patient complains of chronic diarrhea that persists despite current treatment modality. Patient also complains of urinary frequency from Lasix. Patient stated that he's tried to get Dr Leah Armenta to get rid of Lasix but is unable due to chronic peripheral edema. Review of Systems   Constitutional: Negative. Negative for activity change, appetite change, chills, diaphoresis, fatigue, fever and unexpected weight change. HENT: Negative. Eyes: Negative. Respiratory: Negative. Cardiovascular: Positive for leg swelling. Gastrointestinal: Positive for diarrhea. Endocrine: Negative. Genitourinary: Positive for frequency. Musculoskeletal: Negative. Skin: Negative. Allergic/Immunologic: Negative. Neurological: Negative. Hematological: Negative. Psychiatric/Behavioral: Negative. Objective:   Physical Exam  Vitals signs and nursing note reviewed. Constitutional:       General: He is not in acute distress. Appearance: Normal appearance. He is normal weight. He is not ill-appearing, toxic-appearing or diaphoretic. HENT:      Head: Normocephalic. Right Ear: Tympanic membrane, ear canal and external ear normal. There is no impacted cerumen. Left Ear: Tympanic membrane, ear canal and external ear normal. There is no impacted cerumen. Nose: Nose normal. No congestion or rhinorrhea. Mouth/Throat:      Mouth: Mucous membranes are moist.      Pharynx: Oropharynx is clear. No oropharyngeal exudate or posterior oropharyngeal erythema. Eyes:      General: No scleral icterus. Right eye: No discharge. Left eye: No discharge. Extraocular Movements: Extraocular movements intact. Conjunctiva/sclera: Conjunctivae normal.      Pupils: Pupils are equal, round, and reactive to light.    Neck:      Musculoskeletal: Normal range of motion and neck supple. No neck rigidity or muscular tenderness. Vascular: No carotid bruit. Cardiovascular:      Rate and Rhythm: Normal rate and regular rhythm. Pulses: Normal pulses. Heart sounds: Normal heart sounds. No murmur. No friction rub. No gallop. Pulmonary:      Effort: Pulmonary effort is normal. No respiratory distress. Breath sounds: Normal breath sounds. No stridor. No wheezing, rhonchi or rales. Chest:      Chest wall: No tenderness. Abdominal:      General: Bowel sounds are normal. There is no distension. Palpations: Abdomen is soft. There is no mass. Tenderness: There is no abdominal tenderness. There is no right CVA tenderness, left CVA tenderness, guarding or rebound. Hernia: No hernia is present. Comments: Hyperactive bowel sounds   Musculoskeletal: Normal range of motion. General: No swelling, tenderness, deformity or signs of injury. Right lower leg: Edema present. Left lower leg: Edema present. Lymphadenopathy:      Cervical: No cervical adenopathy. Skin:     General: Skin is warm and dry. Capillary Refill: Capillary refill takes less than 2 seconds. Coloration: Skin is not jaundiced or pale. Findings: No bruising, erythema, lesion or rash. Neurological:      General: No focal deficit present. Mental Status: He is alert and oriented to person, place, and time. Cranial Nerves: No cranial nerve deficit. Sensory: No sensory deficit. Motor: Weakness present. Coordination: Coordination normal.      Gait: Gait normal.   Psychiatric:         Mood and Affect: Mood normal.         Behavior: Behavior normal.         Thought Content: Thought content normal.         Judgment: Judgment normal.     BP (!) 113/58   Pulse 68   Temp 97.9 °F (36.6 °C)   Resp 18   Ht 5' 11\" (1.803 m)   Wt 283 lb (128.4 kg)   SpO2 91%   BMI 39.47 kg/m²       Assessment:       Diagnosis Orders   1. Diarrhea, unspecified type     2. Controlled type 2 diabetes mellitus without complication, without long-term current use of insulin ([facility] [address]     3. Mixed hyperlipidemia     4. Lymphedema of both lower extremities     5. Cerebrovascular accident (CVA), unspecified mechanism ([facility] [address]     6. Depression, unspecified depression type     7. Anxiety     8. Gastroesophageal reflux disease without esophagitis     9. TIA (transient ischemic attack)             Plan:      Reviewed medication and plan of care. Continue medication and plan of care. No changes at this time as patient is stable. Offered to prescribe questran for the diarrhea but patient refuses. Will continue loperamide as ordered for diarrhea. Patient stated that diarrhea has been a problem for years and doesn't have a gall bladder. Continue to monitor labs. Follow up in one month and PRN.         CASS León - CNP

## 2020-09-24 PROBLEM — Z87.898 H/O URINARY FREQUENCY: Status: ACTIVE | Noted: 2020-01-01

## 2020-09-24 NOTE — PROGRESS NOTES
icterus. Right eye: No discharge. Left eye: No discharge. Extraocular Movements: Extraocular movements intact. Conjunctiva/sclera: Conjunctivae normal.      Pupils: Pupils are equal, round, and reactive to light. Neck:      Musculoskeletal: Normal range of motion and neck supple. No neck rigidity or muscular tenderness. Vascular: No carotid bruit. Cardiovascular:      Rate and Rhythm: Normal rate and regular rhythm. Pulses: Normal pulses. Heart sounds: Normal heart sounds. No murmur. No friction rub. No gallop. Pulmonary:      Effort: Pulmonary effort is normal. No respiratory distress. Breath sounds: Normal breath sounds. No stridor. No wheezing, rhonchi or rales. Chest:      Chest wall: No tenderness. Abdominal:      General: Abdomen is flat. Bowel sounds are normal. There is no distension. Palpations: Abdomen is soft. There is no mass. Tenderness: There is no abdominal tenderness. There is no right CVA tenderness, left CVA tenderness, guarding or rebound. Hernia: No hernia is present. Musculoskeletal: Normal range of motion. General: No swelling, tenderness, deformity or signs of injury. Right lower leg: Edema present. Left lower leg: Edema present. Comments: Trace edema to lower extremities. Signs of venous insufficiency to the lower extremities. Lymphadenopathy:      Cervical: No cervical adenopathy. Skin:     General: Skin is warm and dry. Capillary Refill: Capillary refill takes less than 2 seconds. Coloration: Skin is not jaundiced or pale. Findings: No bruising, erythema, lesion or rash. Neurological:      General: No focal deficit present. Mental Status: He is alert and oriented to person, place, and time. Cranial Nerves: No cranial nerve deficit. Sensory: No sensory deficit. Motor: No weakness.       Coordination: Coordination normal.      Gait: Gait normal. Psychiatric:         Mood and Affect: Mood normal.         Behavior: Behavior normal.         Thought Content: Thought content normal.         Judgment: Judgment normal.       /65   Pulse 83   Temp 97.9 °F (36.6 °C)   Resp 18   Wt 286 lb (129.7 kg)   SpO2 99%   BMI 39.89 kg/m²       Assessment:       Diagnosis Orders   1. Lymphedema of both lower extremities     2. Cerebrovascular accident (CVA), unspecified mechanism ([facility] [address]     3. TIA (transient ischemic attack)     4. Gastroesophageal reflux disease without esophagitis     5. Controlled type 2 diabetes mellitus without complication, without long-term current use of insulin ([facility]     6. Mixed hyperlipidemia     7. Depression, unspecified depression type     8. Anxiety     9. Diarrhea, unspecified type     10. H/O urinary frequency               Plan:      Reviewed medication, allergies and plan of care. Continue the present plan medications and plan of care. Continue Lasix as ordered for now as benefits of the medication preventing fluid overload outweighs risk. Staff told to have Dr Heather Noguera review Lasix situation on next rounds. Follow up in one month and PRN. Staff told to contact Dr or NP with any issues or concerns.         Jenny Escobar, APRN - CNP

## 2020-10-27 NOTE — PROGRESS NOTES
Subjective:      Patient ID: Kendal Barajas is a 80 y.o. male. Patient seen today as follow up for medication and lab review. Patient has a chief complaint today of chronic diarrhea. Patient stated recently he had 4 bouts of diarrhea in one day's time. Review of Systems   Constitutional: Negative. Negative for activity change, appetite change, chills, diaphoresis, fatigue, fever and unexpected weight change. HENT: Negative. Eyes: Negative. Respiratory: Negative. Cardiovascular: Positive for leg swelling. Gastrointestinal: Positive for diarrhea. Endocrine: Negative. Genitourinary: Negative. Musculoskeletal: Positive for back pain. Skin: Negative. Allergic/Immunologic: Negative. Neurological: Positive for weakness. Hematological: Negative. Psychiatric/Behavioral: Negative. Objective:   Physical Exam  Vitals signs and nursing note reviewed. Constitutional:       General: He is not in acute distress. Appearance: Normal appearance. He is normal weight. He is not ill-appearing, toxic-appearing or diaphoretic. HENT:      Head: Normocephalic. Right Ear: Tympanic membrane, ear canal and external ear normal. There is no impacted cerumen. Left Ear: Tympanic membrane, ear canal and external ear normal. There is no impacted cerumen. Nose: Nose normal. No congestion or rhinorrhea. Mouth/Throat:      Mouth: Mucous membranes are moist.      Pharynx: Oropharynx is clear. No oropharyngeal exudate or posterior oropharyngeal erythema. Eyes:      General: No scleral icterus. Right eye: No discharge. Left eye: No discharge. Extraocular Movements: Extraocular movements intact. Conjunctiva/sclera: Conjunctivae normal.      Pupils: Pupils are equal, round, and reactive to light. Neck:      Musculoskeletal: Normal range of motion and neck supple. No neck rigidity or muscular tenderness. Vascular: No carotid bruit. Cardiovascular:      Rate and Rhythm: Normal rate and regular rhythm. Pulses: Normal pulses. Heart sounds: Normal heart sounds. No murmur. No friction rub. No gallop. Pulmonary:      Effort: Pulmonary effort is normal. No respiratory distress. Breath sounds: Normal breath sounds. No stridor. No wheezing, rhonchi or rales. Chest:      Chest wall: No tenderness. Abdominal:      General: Abdomen is flat. Bowel sounds are normal. There is no distension. Palpations: Abdomen is soft. There is no mass. Tenderness: There is no abdominal tenderness. There is no right CVA tenderness, left CVA tenderness, guarding or rebound. Hernia: No hernia is present. Musculoskeletal: Normal range of motion. General: No swelling, tenderness, deformity or signs of injury. Right lower leg: Edema present. Left lower leg: Edema present. Lymphadenopathy:      Cervical: No cervical adenopathy. Skin:     General: Skin is warm and dry. Capillary Refill: Capillary refill takes less than 2 seconds. Coloration: Skin is not jaundiced or pale. Findings: No bruising, erythema, lesion or rash. Neurological:      General: No focal deficit present. Mental Status: He is alert and oriented to person, place, and time. Cranial Nerves: No cranial nerve deficit. Sensory: No sensory deficit. Motor: Weakness present. Coordination: Coordination normal.      Gait: Gait abnormal.   Psychiatric:         Mood and Affect: Mood normal.         Behavior: Behavior normal.         Thought Content: Thought content normal.         Judgment: Judgment normal.     /67   Pulse 59   Temp 97.3 °F (36.3 °C)   Resp 18   Wt 289 lb (131.1 kg)   SpO2 93%   BMI 40.31 kg/m²       Assessment:       Diagnosis Orders   1. Chronic diarrhea     2. Cerebrovascular accident (CVA), unspecified mechanism ([facility] [address]     3. TIA (transient ischemic attack)     4.  Gastroesophageal reflux disease without esophagitis     5. Controlled type 2 diabetes mellitus without complication, without long-term current use of insulin ([facility] [address]     6. Mixed hyperlipidemia     7. Lymphedema of both lower extremities     8. Depression, unspecified depression type     9. Anxiety             Plan:      Reviewed medication and plan of care. Continue medication and plan of care. Begin Questran 4 grams PO every other day for diarrhea. Follow up in one month and PRN. Staff to notify Dr or NP with any concerns or issues.         Bijan Joe, APRN - CNP

## 2020-11-25 PROBLEM — R04.0 EPISTAXIS: Status: ACTIVE | Noted: 2020-01-01

## 2020-11-25 NOTE — PROGRESS NOTES
Subjective:      Patient ID: Rodriguez Go is a 80 y.o. male. Patient seen today as a monthly follow up for medication and lab review. Staffing noted that has been experiencing nosebleeds and at this time patient is requesting a vaporizer or humidifier to help with his nasal dryness. Review of Systems   Constitutional: Negative. Negative for activity change, appetite change, chills, diaphoresis, fatigue, fever and unexpected weight change. HENT: Positive for nosebleeds. Eyes: Negative. Respiratory: Negative. Cardiovascular: Positive for leg swelling. Gastrointestinal: Positive for diarrhea. Endocrine: Negative. Genitourinary: Positive for frequency. Musculoskeletal: Positive for gait problem. Skin: Negative. Allergic/Immunologic: Negative. Hematological: Negative. Psychiatric/Behavioral: Negative. Objective:   Physical Exam  Vitals signs and nursing note reviewed. Constitutional:       General: He is not in acute distress. Appearance: Normal appearance. He is obese. He is not ill-appearing, toxic-appearing or diaphoretic. HENT:      Head: Normocephalic. Right Ear: Tympanic membrane, ear canal and external ear normal. There is no impacted cerumen. Left Ear: Tympanic membrane, ear canal and external ear normal. There is no impacted cerumen. Nose: Nose normal. No congestion or rhinorrhea. Mouth/Throat:      Mouth: Mucous membranes are moist.      Pharynx: Oropharynx is clear. No oropharyngeal exudate or posterior oropharyngeal erythema. Eyes:      General: No scleral icterus. Right eye: No discharge. Left eye: No discharge. Extraocular Movements: Extraocular movements intact. Conjunctiva/sclera: Conjunctivae normal.      Pupils: Pupils are equal, round, and reactive to light. Neck:      Musculoskeletal: Normal range of motion and neck supple. No neck rigidity or muscular tenderness. Vascular: No carotid bruit. ([facility] [address]     5. Gastroesophageal reflux disease without esophagitis     6. Mixed hyperlipidemia     7. Lymphedema of both lower extremities     8. Diarrhea, unspecified type     9. Anxiety             Plan:      Reviewed medication and plan of care. Continue medication and plan of care. Currently depression and anxiety is stable. No medication changes at this time and he will continue to follow up with psych [facility] on a regular basis. Staff asked to try to obtain a humidifier for patient's room if possible for nasal dryness and nosebleeds. Continue to use nasal saline spray as ordered to help prevent nosebleeds. Follow up in one month and as needed. Staff to contact Dr or NP with any issues or concerns.         Keo Mike, CASS - CNP

## 2021-01-01 ENCOUNTER — APPOINTMENT (OUTPATIENT)
Dept: GENERAL RADIOLOGY | Age: 86
End: 2021-01-01
Payer: MEDICARE

## 2021-01-01 ENCOUNTER — HOSPITAL ENCOUNTER (EMERGENCY)
Age: 86
Discharge: HOME | End: 2021-01-03
Attending: EMERGENCY MEDICINE | Admitting: INTERNAL MEDICINE
Payer: MEDICARE

## 2021-01-01 ENCOUNTER — APPOINTMENT (OUTPATIENT)
Dept: CT IMAGING | Age: 86
End: 2021-01-01
Payer: MEDICARE

## 2021-01-01 VITALS
HEIGHT: 71 IN | TEMPERATURE: 98.8 F | RESPIRATION RATE: 16 BRPM | WEIGHT: 283.5 LBS | DIASTOLIC BLOOD PRESSURE: 53 MMHG | HEART RATE: 69 BPM | SYSTOLIC BLOOD PRESSURE: 101 MMHG | OXYGEN SATURATION: 95 % | BODY MASS INDEX: 39.69 KG/M2

## 2021-01-01 DIAGNOSIS — Z71.89 DNR (DO NOT RESUSCITATE) DISCUSSION: ICD-10-CM

## 2021-01-01 DIAGNOSIS — Z51.5 HOSPICE CARE PATIENT: ICD-10-CM

## 2021-01-01 DIAGNOSIS — N17.9 AKI (ACUTE KIDNEY INJURY) (HCC): ICD-10-CM

## 2021-01-01 DIAGNOSIS — U07.1 ACUTE RESPIRATORY FAILURE DUE TO COVID-19 (HCC): Primary | ICD-10-CM

## 2021-01-01 DIAGNOSIS — J96.00 ACUTE RESPIRATORY FAILURE DUE TO COVID-19 (HCC): Primary | ICD-10-CM

## 2021-01-01 DIAGNOSIS — R77.8 ELEVATED TROPONIN: ICD-10-CM

## 2021-01-01 LAB
ALBUMIN SERPL-MCNC: 3.7 G/DL (ref 3.5–5.2)
ALP BLD-CCNC: 55 U/L (ref 40–129)
ALT SERPL-CCNC: 10 U/L (ref 0–40)
ANION GAP SERPL CALCULATED.3IONS-SCNC: 12 MMOL/L (ref 7–16)
AST SERPL-CCNC: 25 U/L (ref 0–39)
BACTERIA: ABNORMAL /HPF
BASOPHILS ABSOLUTE: 0.03 E9/L (ref 0–0.2)
BASOPHILS RELATIVE PERCENT: 0.3 % (ref 0–2)
BILIRUB SERPL-MCNC: 0.5 MG/DL (ref 0–1.2)
BILIRUBIN URINE: ABNORMAL
BLOOD CULTURE, ROUTINE: NORMAL
BLOOD, URINE: NEGATIVE
BUN BLDV-MCNC: 28 MG/DL (ref 8–23)
CALCIUM SERPL-MCNC: 8.3 MG/DL (ref 8.6–10.2)
CHLORIDE BLD-SCNC: 102 MMOL/L (ref 98–107)
CHP ED QC CHECK: YES
CLARITY: CLEAR
CO2: 25 MMOL/L (ref 22–29)
COARSE CASTS, UA: ABNORMAL /LPF (ref 0–2)
COLOR: ABNORMAL
CREAT SERPL-MCNC: 2.9 MG/DL (ref 0.7–1.2)
CULTURE, BLOOD 2: NORMAL
EKG ATRIAL RATE: 83 BPM
EKG P AXIS: -26 DEGREES
EKG P-R INTERVAL: 158 MS
EKG Q-T INTERVAL: 402 MS
EKG QRS DURATION: 102 MS
EKG QTC CALCULATION (BAZETT): 472 MS
EKG R AXIS: 43 DEGREES
EKG T AXIS: 11 DEGREES
EKG VENTRICULAR RATE: 83 BPM
EOSINOPHILS ABSOLUTE: 0 E9/L (ref 0.05–0.5)
EOSINOPHILS RELATIVE PERCENT: 0 % (ref 0–6)
EPITHELIAL CELLS, UA: ABNORMAL /HPF
GFR AFRICAN AMERICAN: 25
GFR NON-AFRICAN AMERICAN: 21 ML/MIN/1.73
GLUCOSE BLD-MCNC: 227 MG/DL
GLUCOSE BLD-MCNC: 245 MG/DL (ref 74–99)
GLUCOSE URINE: NEGATIVE MG/DL
HCT VFR BLD CALC: 38 % (ref 37–54)
HEMOGLOBIN: 11.8 G/DL (ref 12.5–16.5)
HYALINE CASTS: ABNORMAL /LPF (ref 0–2)
IMMATURE GRANULOCYTES #: 0.15 E9/L
IMMATURE GRANULOCYTES %: 1.3 % (ref 0–5)
KETONES, URINE: ABNORMAL MG/DL
LACTIC ACID, SEPSIS: 1.6 MMOL/L (ref 0.5–1.9)
LEUKOCYTE ESTERASE, URINE: NEGATIVE
LYMPHOCYTES ABSOLUTE: 1.05 E9/L (ref 1.5–4)
LYMPHOCYTES RELATIVE PERCENT: 8.9 % (ref 20–42)
MCH RBC QN AUTO: 30.7 PG (ref 26–35)
MCHC RBC AUTO-ENTMCNC: 31.1 % (ref 32–34.5)
MCV RBC AUTO: 99 FL (ref 80–99.9)
METER GLUCOSE: 227 MG/DL (ref 74–99)
MONOCYTES ABSOLUTE: 1.4 E9/L (ref 0.1–0.95)
MONOCYTES RELATIVE PERCENT: 11.9 % (ref 2–12)
NEUTROPHILS ABSOLUTE: 9.14 E9/L (ref 1.8–7.3)
NEUTROPHILS RELATIVE PERCENT: 77.6 % (ref 43–80)
NITRITE, URINE: NEGATIVE
PDW BLD-RTO: 14 FL (ref 11.5–15)
PH UA: 5 (ref 5–9)
PLATELET # BLD: 195 E9/L (ref 130–450)
PMV BLD AUTO: 9.7 FL (ref 7–12)
POTASSIUM REFLEX MAGNESIUM: 4.6 MMOL/L (ref 3.5–5)
PROTEIN UA: 100 MG/DL
RBC # BLD: 3.84 E12/L (ref 3.8–5.8)
RBC UA: ABNORMAL /HPF (ref 0–2)
SARS-COV-2, NAAT: DETECTED
SODIUM BLD-SCNC: 139 MMOL/L (ref 132–146)
SPECIFIC GRAVITY UA: >=1.03 (ref 1–1.03)
TOTAL PROTEIN: 6.6 G/DL (ref 6.4–8.3)
TROPONIN: 0.15 NG/ML (ref 0–0.03)
URINE CULTURE, ROUTINE: NORMAL
UROBILINOGEN, URINE: 1 E.U./DL
WBC # BLD: 11.8 E9/L (ref 4.5–11.5)
WBC UA: ABNORMAL /HPF (ref 0–5)

## 2021-01-01 PROCEDURE — 1200000000 HC SEMI PRIVATE

## 2021-01-01 PROCEDURE — 84484 ASSAY OF TROPONIN QUANT: CPT

## 2021-01-01 PROCEDURE — 93010 ELECTROCARDIOGRAM REPORT: CPT | Performed by: INTERNAL MEDICINE

## 2021-01-01 PROCEDURE — 6370000000 HC RX 637 (ALT 250 FOR IP): Performed by: STUDENT IN AN ORGANIZED HEALTH CARE EDUCATION/TRAINING PROGRAM

## 2021-01-01 PROCEDURE — 99291 CRITICAL CARE FIRST HOUR: CPT

## 2021-01-01 PROCEDURE — 2580000003 HC RX 258: Performed by: STUDENT IN AN ORGANIZED HEALTH CARE EDUCATION/TRAINING PROGRAM

## 2021-01-01 PROCEDURE — 87040 BLOOD CULTURE FOR BACTERIA: CPT

## 2021-01-01 PROCEDURE — 87088 URINE BACTERIA CULTURE: CPT

## 2021-01-01 PROCEDURE — 81001 URINALYSIS AUTO W/SCOPE: CPT

## 2021-01-01 PROCEDURE — 70450 CT HEAD/BRAIN W/O DYE: CPT

## 2021-01-01 PROCEDURE — 93005 ELECTROCARDIOGRAM TRACING: CPT | Performed by: STUDENT IN AN ORGANIZED HEALTH CARE EDUCATION/TRAINING PROGRAM

## 2021-01-01 PROCEDURE — 74176 CT ABD & PELVIS W/O CONTRAST: CPT

## 2021-01-01 PROCEDURE — U0002 COVID-19 LAB TEST NON-CDC: HCPCS

## 2021-01-01 PROCEDURE — 71250 CT THORAX DX C-: CPT

## 2021-01-01 PROCEDURE — 36415 COLL VENOUS BLD VENIPUNCTURE: CPT

## 2021-01-01 PROCEDURE — 99285 EMERGENCY DEPT VISIT HI MDM: CPT

## 2021-01-01 PROCEDURE — 83605 ASSAY OF LACTIC ACID: CPT

## 2021-01-01 PROCEDURE — 82962 GLUCOSE BLOOD TEST: CPT

## 2021-01-01 PROCEDURE — 72125 CT NECK SPINE W/O DYE: CPT

## 2021-01-01 PROCEDURE — 80053 COMPREHEN METABOLIC PANEL: CPT

## 2021-01-01 PROCEDURE — 85025 COMPLETE CBC W/AUTO DIFF WBC: CPT

## 2021-01-01 PROCEDURE — 71045 X-RAY EXAM CHEST 1 VIEW: CPT

## 2021-01-01 RX ORDER — ACETAMINOPHEN 325 MG/1
650 TABLET ORAL EVERY 6 HOURS PRN
Status: DISCONTINUED | OUTPATIENT
Start: 2021-01-01 | End: 2021-01-01 | Stop reason: HOSPADM

## 2021-01-01 RX ORDER — HYDROXYZINE HYDROCHLORIDE 25 MG/1
25 TABLET, FILM COATED ORAL DAILY
COMMUNITY

## 2021-01-01 RX ORDER — ACETAMINOPHEN 650 MG/1
650 SUPPOSITORY RECTAL EVERY 6 HOURS PRN
Status: DISCONTINUED | OUTPATIENT
Start: 2021-01-01 | End: 2021-01-01 | Stop reason: HOSPADM

## 2021-01-01 RX ORDER — 0.9 % SODIUM CHLORIDE 0.9 %
1000 INTRAVENOUS SOLUTION INTRAVENOUS ONCE
Status: COMPLETED | OUTPATIENT
Start: 2021-01-01 | End: 2021-01-01

## 2021-01-01 RX ORDER — MELATONIN
2000 DAILY
COMMUNITY

## 2021-01-01 RX ORDER — PROMETHAZINE HYDROCHLORIDE 25 MG/1
12.5 TABLET ORAL EVERY 6 HOURS PRN
Status: DISCONTINUED | OUTPATIENT
Start: 2021-01-01 | End: 2021-01-01 | Stop reason: HOSPADM

## 2021-01-01 RX ORDER — GABAPENTIN 100 MG/1
200 CAPSULE ORAL 2 TIMES DAILY
COMMUNITY

## 2021-01-01 RX ORDER — SODIUM CHLORIDE 0.9 % (FLUSH) 0.9 %
10 SYRINGE (ML) INJECTION PRN
Status: DISCONTINUED | OUTPATIENT
Start: 2021-01-01 | End: 2021-01-01 | Stop reason: HOSPADM

## 2021-01-01 RX ORDER — MORPHINE SULFATE 2 MG/ML
3 INJECTION, SOLUTION INTRAMUSCULAR; INTRAVENOUS
Status: DISCONTINUED | OUTPATIENT
Start: 2021-01-01 | End: 2021-01-01

## 2021-01-01 RX ORDER — LORAZEPAM 2 MG/ML
1 INJECTION INTRAMUSCULAR
Status: DISCONTINUED | OUTPATIENT
Start: 2021-01-01 | End: 2021-01-01

## 2021-01-01 RX ORDER — CHOLESTYRAMINE 4 G/9G
1 POWDER, FOR SUSPENSION ORAL EVERY OTHER DAY
COMMUNITY

## 2021-01-01 RX ORDER — ACETAMINOPHEN 650 MG/1
650 SUPPOSITORY RECTAL ONCE
Status: COMPLETED | OUTPATIENT
Start: 2021-01-01 | End: 2021-01-01

## 2021-01-01 RX ORDER — IPRATROPIUM BROMIDE AND ALBUTEROL SULFATE 2.5; .5 MG/3ML; MG/3ML
1 SOLUTION RESPIRATORY (INHALATION) EVERY 4 HOURS PRN
COMMUNITY

## 2021-01-01 RX ORDER — UREA 10 %
0.5 LOTION (ML) TOPICAL DAILY
COMMUNITY

## 2021-01-01 RX ORDER — LORAZEPAM 2 MG/ML
1 INJECTION INTRAMUSCULAR
Status: DISCONTINUED | OUTPATIENT
Start: 2021-01-01 | End: 2021-01-01 | Stop reason: HOSPADM

## 2021-01-01 RX ORDER — POLYETHYLENE GLYCOL 3350 17 G/17G
17 POWDER, FOR SOLUTION ORAL DAILY PRN
Status: DISCONTINUED | OUTPATIENT
Start: 2021-01-01 | End: 2021-01-01 | Stop reason: HOSPADM

## 2021-01-01 RX ORDER — MORPHINE SULFATE 2 MG/ML
2 INJECTION, SOLUTION INTRAMUSCULAR; INTRAVENOUS
Status: DISCONTINUED | OUTPATIENT
Start: 2021-01-01 | End: 2021-01-01 | Stop reason: HOSPADM

## 2021-01-01 RX ORDER — ONDANSETRON 4 MG/1
4 TABLET, ORALLY DISINTEGRATING ORAL EVERY 8 HOURS PRN
COMMUNITY

## 2021-01-01 RX ORDER — LORAZEPAM 2 MG/ML
1 INJECTION INTRAMUSCULAR EVERY 4 HOURS
Status: DISCONTINUED | OUTPATIENT
Start: 2021-01-01 | End: 2021-01-01

## 2021-01-01 RX ORDER — MORPHINE SULFATE 2 MG/ML
2 INJECTION, SOLUTION INTRAMUSCULAR; INTRAVENOUS EVERY 4 HOURS
Status: DISCONTINUED | OUTPATIENT
Start: 2021-01-01 | End: 2021-01-01

## 2021-01-01 RX ORDER — ONDANSETRON 2 MG/ML
4 INJECTION INTRAMUSCULAR; INTRAVENOUS EVERY 6 HOURS PRN
Status: DISCONTINUED | OUTPATIENT
Start: 2021-01-01 | End: 2021-01-01 | Stop reason: HOSPADM

## 2021-01-01 RX ORDER — MORPHINE SULFATE 2 MG/ML
4 INJECTION, SOLUTION INTRAMUSCULAR; INTRAVENOUS EVERY 4 HOURS
Status: DISCONTINUED | OUTPATIENT
Start: 2021-01-01 | End: 2021-01-01

## 2021-01-01 RX ORDER — GABAPENTIN 300 MG/1
300 CAPSULE ORAL DAILY
COMMUNITY

## 2021-01-01 RX ORDER — SODIUM CHLORIDE 0.9 % (FLUSH) 0.9 %
10 SYRINGE (ML) INJECTION EVERY 12 HOURS SCHEDULED
Status: DISCONTINUED | OUTPATIENT
Start: 2021-01-01 | End: 2021-01-01 | Stop reason: HOSPADM

## 2021-01-01 RX ADMIN — SODIUM CHLORIDE 1000 ML: 9 INJECTION, SOLUTION INTRAVENOUS at 08:56

## 2021-01-01 RX ADMIN — SODIUM CHLORIDE 1000 ML: 9 INJECTION, SOLUTION INTRAVENOUS at 10:39

## 2021-01-01 RX ADMIN — ACETAMINOPHEN 650 MG: 650 SUPPOSITORY RECTAL at 08:56

## 2021-01-01 ASSESSMENT — PAIN SCALES - GENERAL
PAINLEVEL_OUTOF10: 0
PAINLEVEL_OUTOF10: 0

## 2021-01-01 ASSESSMENT — ENCOUNTER SYMPTOMS
VOMITING: 0
SHORTNESS OF BREATH: 1

## 2021-01-03 PROBLEM — J96.90 RESPIRATORY FAILURE (HCC): Status: ACTIVE | Noted: 2021-01-01

## 2021-01-03 NOTE — ED NOTES
Bethany a nurse at [facility] informed of patients Covid + status, per [facility] is utilized frequently in their facility.      Manson Brunner, RN  01/03/21 1024

## 2021-01-03 NOTE — Clinical Note
Patient Class: Inpatient [101]  REQUIRED: Diagnosis: Respiratory failure ([facility] [address] [[id number]]  Estimated Length of Stay: Estimated stay of less than 2 midnights  Admitting Provider: Adama Todd [[id number]]  Telemetry Bed Required?: Yes

## 2021-01-03 NOTE — ED NOTES
Bed: 03  Expected date:   Expected time:   Means of arrival:   Comments:  KIM Mckeon, RN  01/03/21 [id number]

## 2021-01-03 NOTE — ED PROVIDER NOTES
[id number] Discussed case with daughter Kayley Rincon who directed to violeta who states he is financial POA. They do not have medical power of . Sister Kayley Rincon is coming to the ED. [CF]      ED Course User Index  [CF] Laxmi Nur, DO       --------------------------------------------- PAST HISTORY ---------------------------------------------  Past Medical History:  has a past medical history of Anemia, Arteriosclerotic heart disease, BPH (benign prostatic hyperplasia), Cerebrovascular accident (CVA) ([facility] [address] Chronic back pain, Depression, Diabetes mellitus ([facility] [address] Diverticular disease, DJD (degenerative joint disease), multiple sites, GERD (gastroesophageal reflux disease), Hyperlipidemia, Intertrigo, Lumbar radiculopathy, Neuropathy, Pain of facet joint, Peptic reflux disease, Unspecified cerebral artery occlusion with cerebral infarction, and Vitamin B12 deficiency. Past Surgical History:  has a past surgical history that includes Appendectomy; cervical fusion; Cholecystectomy; other surgical history; Tonsillectomy; Cataract removal; and Bladder surgery. Social History:  reports that he has quit smoking. He has never used smokeless tobacco. He reports that he does not drink alcohol or use drugs. Family History: family history includes Heart Attack in his brother and another family member; Ovarian Cancer in his sister. The patients home medications have been reviewed.     Allergies: No known allergies    -------------------------------------------------- RESULTS -------------------------------------------------    Lab  Results for orders placed or performed during the hospital encounter of 01/03/21   CBC Auto Differential   Result Value Ref Range    WBC 11.8 (H) 4.5 - 11.5 E9/L    RBC 3.84 3.80 - 5.80 E12/L    Hemoglobin 11.8 (L) 12.5 - 16.5 g/dL    Hematocrit 38.0 37.0 - 54.0 %    MCV 99.0 80.0 - 99.9 fL    MCH 30.7 26.0 - 35.0 pg    MCHC 31.1 (L) 32.0 - 34.5 %    RDW 14.0 11.5 - 15.0 fL Platelets 003 247 - 017 E9/L    MPV 9.7 7.0 - 12.0 fL    Neutrophils % 77.6 43.0 - 80.0 %    Immature Granulocytes % 1.3 0.0 - 5.0 %    Lymphocytes % 8.9 (L) 20.0 - 42.0 %    Monocytes % 11.9 2.0 - 12.0 %    Eosinophils % 0.0 0.0 - 6.0 %    Basophils % 0.3 0.0 - 2.0 %    Neutrophils Absolute 9.14 (H) 1.80 - 7.30 E9/L    Immature Granulocytes # 0.15 E9/L    Lymphocytes Absolute 1.05 (L) 1.50 - 4.00 E9/L    Monocytes Absolute 1.40 (H) 0.10 - 0.95 E9/L    Eosinophils Absolute 0.00 (L) 0.05 - 0.50 E9/L    Basophils Absolute 0.03 0.00 - 0.20 E9/L   Comprehensive Metabolic Panel w/ Reflex to MG   Result Value Ref Range    Sodium 139 132 - 146 mmol/L    Potassium reflex Magnesium 4.6 3.5 - 5.0 mmol/L    Chloride 102 98 - 107 mmol/L    CO2 25 22 - 29 mmol/L    Anion Gap 12 7 - 16 mmol/L    Glucose 245 (H) 74 - 99 mg/dL    BUN 28 (H) 8 - 23 mg/dL    CREATININE 2.9 (H) 0.7 - 1.2 mg/dL    GFR Non-African American 21 >=60 mL/min/1.73    GFR African American 25     Calcium 8.3 (L) 8.6 - 10.2 mg/dL    Total Protein 6.6 6.4 - 8.3 g/dL    Alb 3.7 3.5 - 5.2 g/dL    Total Bilirubin 0.5 0.0 - 1.2 mg/dL    Alkaline Phosphatase 55 40 - 129 U/L    ALT 10 0 - 40 U/L    AST 25 0 - 39 U/L   Troponin   Result Value Ref Range    Troponin 0.15 (H) 0.00 - 0.03 ng/mL   Urinalysis, reflex to microscopic   Result Value Ref Range    Color, UA DARK YELLOW (A) Straw/Yellow    Clarity, UA Clear Clear    Glucose, Ur Negative Negative mg/dL    Bilirubin Urine MODERATE (A) Negative    Ketones, Urine TRACE (A) Negative mg/dL    Specific Gravity, UA >=1.030 1.005 - 1.030    Blood, Urine Negative Negative    pH, UA 5.0 5.0 - 9.0    Protein,  (A) Negative mg/dL    Urobilinogen, Urine 1.0 <2.0 E.U./dL    Nitrite, Urine Negative Negative    Leukocyte Esterase, Urine Negative Negative   Lactate, Sepsis   Result Value Ref Range    Lactic Acid, Sepsis 1.6 0.5 - 1.9 mmol/L   COVID-19   Result Value Ref Range    SARS-CoV-2, NAAT DETECTED (A) Not Detected Microscopic Urinalysis   Result Value Ref Range    Hyaline Casts, UA 0-2 0 - 2 /LPF    Coarse Casts, UA 0-2 0 - 2 /LPF    WBC, UA 0-1 0 - 5 /HPF    RBC, UA NONE 0 - 2 /HPF    Epithelial Cells, UA FEW /HPF    Bacteria, UA FEW (A) None Seen /HPF   POCT Glucose   Result Value Ref Range    Glucose 227 mg/dL    QC OK? yes    POCT Glucose   Result Value Ref Range    Meter Glucose 227 (H) 74 - 99 mg/dL       Radiology  CT Head WO Contrast   Final Result   1. There is no acute intracranial abnormality. Specifically, there is no   intracranial hemorrhage. 2. Atrophy and periventricular leukomalacia,   3. Old large left MCA distribution infarct. 4.      CT ABDOMEN PELVIS WO CONTRAST Additional Contrast? None   Final Result   1. Diverticulosis coli without diverticulitis. No free fluid,   pneumoperitoneum, or acute abdominal disease identified. 2.  Bilateral lung base atelectatic changes. CT CHEST WO CONTRAST   Final Result   1. Right lung infiltrates compatible with pneumonia. Left basilar   additional small infiltrate or atelectasis. 2.  Borderline cardiomegaly. Coronary artery calcifications. CT CERVICAL SPINE WO CONTRAST   Final Result   1. There is no acute compression fracture or subluxation of the cervical   spine. 2. Multilevel degenerative disc and degenerative joint disease. 3. Vague lucency seen within the base of the dens extending through the body   of C2 favored to represent a nutrient foramen. Intradepartmental   consultation was obtained. XR CHEST PORTABLE   Final Result   1. Multifocal bilateral hazy pulmonary infiltrates   2. Cardiomegaly. ------------------------- NURSING NOTES AND VITALS REVIEWED ---------------------------  Date / Time Roomed:  1/3/2021  8:35 AM  ED Bed Assignment:  03/03    The nursing notes within the ED encounter and vital signs as below have been reviewed.    Patient Vitals for the past 24 hrs: BP Temp Temp src Pulse Resp SpO2 Height Weight   01/03/21 1034 (!) 101/53 98.8 °F (37.1 °C) Axillary 69 16 95 %     01/03/21 0947 (!) 90/47 99.2 °F (37.3 °C)  73 22 95 %     01/03/21 0904 (!) 105/52     94 %     01/03/21 0902  101.2 °F (38.4 °C) Rectal 80       01/03/21 0854 (!) 92/53   78  94 %     01/03/21 0853  100 °F (37.8 °C) Axillary        01/03/21 0846 (!) 82/52  Infrared  20 95 % 5' 11\" (1.803 m) 283 lb 8 oz (128.6 kg)       Oxygen Saturation Interpretation: Normal      ------------------------------------------ PROGRESS NOTES ------------------------------------------  Re-evaluation(s):  Time: 1200. Patients symptoms show no change  Repeat physical examination is not changed    Time: 1230. Patients symptoms show no change  Repeat physical examination is not changed    I have spoken with the patient and discussed todays results, in addition to providing specific details for the plan of care and counseling regarding the diagnosis and prognosis. Their questions are answered at this time and they are agreeable with the plan.      --------------------------------- ADDITIONAL PROVIDER NOTES ---------------------------------  Consultations:  Spoke with Dr. Dinora Fuentes,  They will admit this patient until hospice ready to accept patient. This patient's ED course included: a personal history and physicial examination, re-evaluation prior to disposition, multiple bedside re-evaluations, IV medications, cardiac monitoring, continuous pulse oximetry and complex medical decision making and emergency management    Patient blood pressure improved during ED course. Please note that the withdrawal or failure to initiate urgent interventions for this patient would likely result in a life threatening deterioration or permanent disability. Accordingly this patient received 30 minutes of critical care time, excluding separately billable procedures.       Clinical Impression 1. Acute respiratory failure due to COVID-19 ([facility] [address]    2. DNR (do not resuscitate) discussion    3. Hospice care patient    4. ALEXANDRO (acute kidney injury) ([facility] [address]    5. Elevated troponin          Disposition  Patient's disposition: Admit to telemetry until can be transferred to hospice  Patient's condition is serious.        Fern Bradshaw DO  Resident  01/03/21 [id number]

## 2021-01-03 NOTE — ED NOTES
Spoke with beryl from [facility] [phone]. Pt was fine yesterday found unresponsive this morning. States there is a DNR CCA on file and will fax paper work to ed.  ozzie is calling family to find wishes     Rupali [facility]  01/03/21 8362

## 2021-01-03 NOTE — PROGRESS NOTES
Past Surgical History:        Procedure Laterality Date    APPENDECTOMY      BLADDER SURGERY      CATARACT REMOVAL      CERVICAL FUSION      CHOLECYSTECTOMY      OTHER SURGICAL HISTORY      ULCER REPAIR    TONSILLECTOMY         Allergies:  No known allergies    Family Goal: comfort care    Meeting held with spoke with Abdiaziz Lopez    Referral received. Chart reviewed. Call placed to ER. Spoke with Ashia Mcdaniel RN and received update on patient. Call placed to Abdiaziz Lopez. Explained hospice philosophy and no longer pursuing any further aggressive treatment. Focusing on comfort care only. Explained hospice benefit and reviewed all levels of care including the hospice house for short term symptom management. Once symptoms are managed patient would transfer to a routine level of care either home or ECF with hospice. Explained room and board would be private pay at a facility. Discussed roles and frequency visits of skilled nursing, personal care team and SW. Benito Smalln would like this nurse to speak with sister Tita as well. He feels that the hospice house is the best place for patient. Answered all questions. Benito Smalln provided [facility] number. Call received from [facility]. Explained hospice philosophy and benefit. Discussed hospice house. Tita would like patient to be evaluated for the hospice house. Patient is COVID positive and on 15L NRB. Patient having labored breathing. Respirations are 24. Patient was given Tylenol supp. For fever of 101. Medication effective. Patient is from [facility] and alert and oriented last evening. He was found unresponsive this morning and transferred to [facility]. Covid test was done and came back with a positive result. Call placed to Dr. Scott Cruz, [facility], [facility] physician. Reviewed chart and assessment. Patient has been accepted to the hospice house for short term symptom management for labored breathing requiring high level of oxygen due to Hypoxic respiratory failure due to viral pneumonia. Call placed to the hospice house. Received time of ASAP to room 113. Nurse to nurse report given to Stephani, [facility]. Updated family and ER nurse. Sergio Toney has given permission for patient's daughter Tita to sign hospice consents. Call placed to [facility] ambulance and will arrive within the hour. Faxed face sheet and ambulance form to [facility]. Please leave IV site in place. Discharge Plan:  Discharge Disposition;  [address] Name: Hospice house    Electronically signed by Davina Powell RN on 1/3/2021 at 12:33 PM

## 2021-01-03 NOTE — ED NOTES
Spoke to case management/social work. Community hospice does not have privileges in this hospital. [address] notified of referral. Family notified of changes.        Ryan Dalton RN  01/03/21 1570